# Patient Record
Sex: MALE | Race: OTHER | HISPANIC OR LATINO | URBAN - METROPOLITAN AREA
[De-identification: names, ages, dates, MRNs, and addresses within clinical notes are randomized per-mention and may not be internally consistent; named-entity substitution may affect disease eponyms.]

---

## 2017-11-08 ENCOUNTER — EMERGENCY (EMERGENCY)
Facility: HOSPITAL | Age: 37
LOS: 1 days | Discharge: HOME | End: 2017-11-08
Admitting: EMERGENCY MEDICINE
Payer: COMMERCIAL

## 2017-11-08 VITALS
DIASTOLIC BLOOD PRESSURE: 78 MMHG | OXYGEN SATURATION: 97 % | TEMPERATURE: 98 F | SYSTOLIC BLOOD PRESSURE: 123 MMHG | RESPIRATION RATE: 16 BRPM | HEART RATE: 76 BPM

## 2017-11-08 VITALS
TEMPERATURE: 98 F | HEART RATE: 88 BPM | OXYGEN SATURATION: 96 % | DIASTOLIC BLOOD PRESSURE: 87 MMHG | RESPIRATION RATE: 16 BRPM | SYSTOLIC BLOOD PRESSURE: 129 MMHG

## 2017-11-08 DIAGNOSIS — S01.01XA LACERATION WITHOUT FOREIGN BODY OF SCALP, INITIAL ENCOUNTER: ICD-10-CM
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
## 2017-11-08 DIAGNOSIS — Z23 ENCOUNTER FOR IMMUNIZATION: ICD-10-CM
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
## 2017-11-08 PROCEDURE — 12001 RPR S/N/AX/GEN/TRNK 2.5CM/<: CPT

## 2017-11-08 PROCEDURE — 99283 EMERGENCY DEPT VISIT LOW MDM: CPT | Mod: 25

## 2017-11-08 RX ORDER — BACITRACIN ZINC 500 UNIT/G
1 OINTMENT IN PACKET (EA) TOPICAL ONCE
Qty: 0 | Refills: 0 | Status: COMPLETED | OUTPATIENT
Start: 2017-11-08 | End: 2017-11-08

## 2017-11-08 RX ORDER — TETANUS TOXOID, REDUCED DIPHTHERIA TOXOID AND ACELLULAR PERTUSSIS VACCINE, ADSORBED 5; 2.5; 8; 8; 2.5 [IU]/.5ML; [IU]/.5ML; UG/.5ML; UG/.5ML; UG/.5ML
0.5 SUSPENSION INTRAMUSCULAR ONCE
Qty: 0 | Refills: 0 | Status: COMPLETED | OUTPATIENT
Start: 2017-11-08 | End: 2017-11-08

## 2017-11-08 RX ADMIN — Medication 1 APPLICATION(S): at 10:31

## 2017-11-08 RX ADMIN — TETANUS TOXOID, REDUCED DIPHTHERIA TOXOID AND ACELLULAR PERTUSSIS VACCINE, ADSORBED 0.5 MILLILITER(S): 5; 2.5; 8; 8; 2.5 SUSPENSION INTRAMUSCULAR at 10:31

## 2017-11-08 NOTE — ED PROVIDER NOTE - PHYSICAL EXAMINATION
Scalp: 2.5 cm laceration to anterior scalp.     VITAL SIGNS: I have reviewed nursing notes and confirm.  CONSTITUTIONAL: Well-developed; well-nourished; in no acute distress.  SKIN: Skin is warm and dry, no acute rash.  HEAD: Normocephalic;  EYES: PERRL, EOM intact; conjunctiva and sclera clear.  ENT: No nasal discharge; airway clear.  NECK: Supple; non tender.  CARD: S1, S2 normal; no murmurs, gallops, or rubs. Regular rate and rhythm.  RESP: No wheezes, rales or rhonchi.  ABD: Normal bowel sounds; soft; non-distended; non-tender; no hepatosplenomegaly.  EXT: Normal ROM. No clubbing, cyanosis or edema.  NEURO: Alert, oriented. Grossly unremarkable.  PSYCH: Cooperative, appropriate.

## 2017-11-08 NOTE — ED PROVIDER NOTE - OBJECTIVE STATEMENT
Denies LOC, headache, confusion, changes in vision, N/V, chest pain, SOB 36 yo M no PMHx c/o laceration to scalp after hitting his head on the trunk of his car while at work. Wound is located on anterior scalp, approx 2.5cm linear. Wound is clean, oozing blood, no foreign body present, sensation is intact. Pt has no focal neuro deficits and has no other complaints at this time.   Denies LOC, headache, confusion, changes in vision, N/V, chest pain, SOB

## 2017-11-08 NOTE — ED ADULT NURSE NOTE - OBJECTIVE STATEMENT
Pt states he hit his head at work, sustained laceration to top of head. Denies LOC. No active bleeding noted, pt unsure of last tetanus

## 2017-11-08 NOTE — ED PROVIDER NOTE - MEDICAL DECISION MAKING DETAILS
Scalp laceration, close with staples, see procedure note. Tetanus updated today. Counseled to f/u for staple removal in 5-7 days.

## 2017-11-15 ENCOUNTER — EMERGENCY (EMERGENCY)
Facility: HOSPITAL | Age: 37
LOS: 1 days | Discharge: HOME | End: 2017-11-15
Admitting: EMERGENCY MEDICINE

## 2017-11-15 VITALS
TEMPERATURE: 98 F | RESPIRATION RATE: 17 BRPM | OXYGEN SATURATION: 99 % | DIASTOLIC BLOOD PRESSURE: 71 MMHG | HEART RATE: 84 BPM | SYSTOLIC BLOOD PRESSURE: 122 MMHG

## 2017-11-15 VITALS — WEIGHT: 199.96 LBS

## 2017-11-15 DIAGNOSIS — Z48.02 ENCOUNTER FOR REMOVAL OF SUTURES: ICD-10-CM

## 2017-11-15 NOTE — ED PROVIDER NOTE - OBJECTIVE STATEMENT
Pt is 36 yo male who presents after 7 days for staple removal to scalp.  Pt with no complaints and no s/s of infection.

## 2017-11-15 NOTE — ED PROVIDER NOTE - SKIN, MLM
Skin normal color for race, warm, dry and intact. No evidence of rash. well healing laceration to scalp

## 2017-11-15 NOTE — ED PROVIDER NOTE - HEAD, MLM
Head is atraumatic. Head shape is symmetrical. area of frontal scalp where staples were now with no redness or swelling, no s/s of infection

## 2017-11-15 NOTE — ED ADULT NURSE NOTE - CHPI ED SYMPTOMS NEG
no chills/no bleeding/no bleeding at site/no inflammation/no red streaks/no drainage/no fever/no purulent drainage/no redness/no pain

## 2024-05-16 NOTE — ED PROVIDER NOTE - CROS ED CONS ALL NEG
[Mucoid Discharge] : mucoid discharge [NL] : warm, clear [[email or web address]] : thick purulent drainage [de-identified] : purulent post nasal drip negative...